# Patient Record
(demographics unavailable — no encounter records)

---

## 2025-06-13 NOTE — ASSESSMENT
[FreeTextEntry1] : phimosis failed topical A long discussion was had with the patient risks and benefits of circumcision. Patient understands that this is a cosmetic procedure and that there may be aesthetic changes in his penis which are unpleasing to him. In addition changes in penile sensitivity were discussed as was the risk of infection at the circumcision site. The concerns of phallic shortening were addressed as well and understands that we will not be shortening his phallus in any way.all questions were answered. Patient will be scheduled for circumcision in the office

## 2025-06-13 NOTE — LETTER BODY
[Dear  ___] : Dear  [unfilled], [FreeTextEntry2] : Michelet Person MD [address] [place] [address]  [FreeTextEntry1] : I had the pleasure of seeing your patient,  ULICES RANKIN, a 29 year old man, in the office in consultation today. Please see the attached note for full details.  Thank you very much for allowing me to participate in the care of this patient. If you have any questions please feel free to call me at any time.    Sincerely yours,    Carlos Solo MD, TREVOR Director, Male Fertility and Microsurgery  of [facility]